# Patient Record
Sex: FEMALE | Race: WHITE | Employment: UNEMPLOYED | URBAN - METROPOLITAN AREA
[De-identification: names, ages, dates, MRNs, and addresses within clinical notes are randomized per-mention and may not be internally consistent; named-entity substitution may affect disease eponyms.]

---

## 2017-06-30 ENCOUNTER — APPOINTMENT (OUTPATIENT)
Dept: PHYSICAL THERAPY | Age: 57
End: 2017-06-30

## 2017-07-03 ENCOUNTER — APPOINTMENT (OUTPATIENT)
Dept: PHYSICAL THERAPY | Age: 57
End: 2017-07-03
Payer: COMMERCIAL

## 2017-07-19 ENCOUNTER — HOSPITAL ENCOUNTER (OUTPATIENT)
Dept: PHYSICAL THERAPY | Age: 57
Discharge: HOME | End: 2017-07-19
Payer: COMMERCIAL

## 2017-07-19 PROCEDURE — 97161 PT EVAL LOW COMPLEX 20 MIN: CPT

## 2017-07-19 PROCEDURE — 97112 NEUROMUSCULAR REEDUCATION: CPT

## 2017-07-19 NOTE — PROGRESS NOTES
PHYSICAL THERAPY - DAILY TREATMENT NOTE    Patient Name: Dana Foster        Date: 2017  : 1960   YES Patient  Verified  Visit #:   1   of   4  Insurance: Payor: Gracia Vela / [place]: [place] / Product Type: PPO /      In time: 11:00 Out time: 11;50   Total Treatment Time: 50     Medicare Time Tracking (below)   Total Timed Codes (min):  NA 1:1 Treatment Time:  NA     TREATMENT AREA =  Neuropathy, weakness, imbalance    SUBJECTIVE    Pain Level (on 0 to 10 scale):  0  / 10   Medication Changes/New allergies or changes in medical history, any new surgeries or procedures? NO    If yes, update Summary List   Subjective Functional Status/Changes:  []  No changes reported     Pt reports that she continues to feel off balance, continues to have numbness in tips of fingers and has noticed core weakness. Pt reports that she was doing exercises previously in PT, but stopped in the spring. Pt denies dizziness, pain. Pt reports occasional cramping B feet.           OBJECTIVE    Physical Therapy Evaluation  Neurologic    Posture: [] Poor    [x] Fair    [] Good    Describe: Minimally protracted head/shoulders       Gait: [x] Normal    [] Abnormal    Device: None     Describe: FGA    ROM:                             AROM    PROM   Shoulder Left Right Left Right   Flex [facility] WFL     Ext [facility] WFL     ABD [facility]     ER [facility] WFL     IR WFL WFL              AROM    PROM   Knee Left Right Left Right   Ext [facility]     Flex [facility] WFL               AROM                           PROM  Hip Left Right Left Right   Flex [facility] WFL     Ext [facility] WFL     ABD [facility]     ER WFL WFL     IR WFL WFL                                              AROM      PROM   Ankle Left Right Left Right   Ext [facility] WFL       Strength (MMT):  Shoulder L (1-5) R (1-5)   Shoulder Flexion 5 5   Shoulder Ext 5 5   Shoulder ABD 5 5   Shoulder ADD 5 5   Shoulder IR 5 5   Shoulder ER 5 5                                             Hip L (1-5) R (1-5)   Hip Flexion 4 4   Hip Ext 4 4   Hip ABD 4 4   Hip ADD NT NT   Hip ER NT NT   Hip IR NT NT     Knee L (1-5) R (1-5)   Knee Flexion 5 5   Knee Extension 5 5   Ankle PF 5 5   Ankle DF 5 5   Other       Tone: WNL    Motor Control: WNL    Sensation: Neuropathy - numbness fingers B hands    Reflexes: [x] Not Tested   Left Right   Biceps (C5)     Brachioradiais (C6)     Triceps (C7)     Knee Jerk (L4)     Ankle Jerk (SI)       Balance/ Equilibrium:              Left            Right  Tracks Across Midline yes yes   Reaches Across Midline yes yes         Sitting Balance: Static:  [x] Good    [] Fair    [] Poor     Dynamic:   [x] Good    [] Fair    [] Poor        Standing Balance: Static:   [] Good    [x] Fair    [] Poor     Dynamic:   [] Good    [x] Fair    [] Poor        Protective Extension:  [x] Present    [] Delayed    [] Absent        Romberg on foam: 30\"/30\"    Rail: 30\"/4\"      Sharpened Romber\"B EO/3\"B EC      Single Leg Stance: 30\"B    Functional Mobility      Bed Mobility:      Scooting: I       Rolling: I       Sit-Supine: I      Transfers:       Sit-Stand: I       Floor-Stand: NT      Gait:       Tandem: FGA       Backwards: FGA       Braiding: NT      Elevations:       Curbs: NT       Ramps: NT      Stairs: FGA    Behavior: [x] Cooperative    [] Impulsive    [] Agitated    [] Perseverative    [] Confused   Oriented x: 4    Cognition: [] One Step Commands   [x] Multiple Commands   [] Displays Neglect [] R  [] L    Other:       Impaired Judgement: [] Y    [x] N      Impaired Vision:  [] Y    [x] N      Safety Awareness Deficits  [] Y    [x] N      Impaired Hearing  [] Y    [x] N      Able to Express Needs [x] Y    [] N    Optional Tests:       Dynamic Gait Index (24pt scale): Functional Gait Assessment (30pt scale):        Morris Balance Scale (56pt scale):     Other test /comments:    15 min Neuromuscular Re-ed: See flowsheet   Rationale: improve balance to improve the patient's ability to perform ADLs/IADLs, functional mobility and gait safely and independently       During NM min Patient Education:  YES  Reviewed HEP   []  Progressed/Changed HEP based on:   Initiated HEP (copy in chart)     Other Objective/Functional Measures:    Initiated VSE and balance ex (refer to flowsheet for details)     Post Treatment Pain Level (on 0 to 10) scale:   0  / 10     ASSESSMENT    Assessment/Changes in Function:     Justification for Eval Code Complexity:  Patient History : HIGH - peripheral neuropathy; h/o breast cancer - s/p mastectomy and chemo/radiation, h/o skin cancer, s/p resection; HTN; prior vestibular therapy Examination HIGH - See objective  Clinical Presentation: LOW  Clinical Decision Making : LOW - FOTO 85/100    See objective     []  See Progress Note/Recertification   Patient will continue to benefit from skilled PT services: see plan of care   Progress toward goals / Updated goals:    See plan of care     PLAN    [x]  Upgrade activities as tolerated YES Continue plan of care   []  Discharge due to :    []  Other:      Therapist: Adrianne Alfonso, PT    Date: 7/19/2017 Time: 11:04 AM

## 2017-07-19 NOTE — PROGRESS NOTES
[address]  [facility] PHYSICAL THERAPY  [address] [facility] [address], [address] - Phone: [phone]  Fax: [phone] / [phone] [facility]  Patient Name: Gonzalez Oleary : 1960   Medical   Diagnosis: Gait instability [R26.81] Treatment Diagnosis: Gait instability [R26.81]   Onset Date: Chronic     Referral Source: Blane Dickey MD [facility]): 2017   Prior Hospitalization: See medical history Provider #: [id number]   Prior Level of Function: Independent with ADLs, ambulation; walking for exercise, playing golf, traveling   Comorbidities: Peripheral neuropathy; h/o breast cancer - s/p mastectomy and chemo/radiation, h/o skin cancer, s/p resection; HTN; prior physical therapy for balance training   Medications: Verified on Patient Summary List   The Plan of Care and following information is based on the information from the initial evaluation.   ===========================================================================================  Assessment / key information:  Patient is a 56 y.o. female who presents with complaints that she continues to feel off balance, continues to have numbness in tips of fingers and has noticed core weakness. Patient reports that she stopped doing the exercises previously prescribed in PT. Patient demonstrates decreased core/hip strength, decreased balance with eyes closed (rail stance EC = 4\", sharpened Romberg EC = 3\"B), and decreased balance with gait (Functional Gait Assessment = /30). FOTO = 85/100 (slight functional limitation). Balance with eyes closed and scores on FGA/FOTO have decreased compared to when patient was discharged from PT 2016. Patient would benefit from skilled PT services to address these issues and improve function.   Thank you for this referral.  ==========================================================================================  Eval Complexity: History: HIGH Complexity :3+ comorbidities / personal factors will impact the outcome/ POC Exam:HIGH Complexity : 4+ Standardized tests and measures addressing body structure, function, activity limitation and / or participation in recreation  Presentation: LOW Complexity : Stable, uncomplicated  Clinical Decision Making:LOW Complexity : FOTO score of 75-100Overall Complexity:LOW     Problem List: decrease strength, impaired gait/ balance and decrease ADL/ functional abilitiies   Treatment Plan may include any combination of the following: Therapeutic exercise, Therapeutic activities, Neuromuscular re-education, Physical agent/modality, Gait/balance training, Manual therapy, Patient education, Functional mobility training and Stair training  Patient / Family readiness to learn indicated by: asking questions, trying to perform skills and interest  Persons(s) to be included in education: patient (P)  Barriers to Learning/Limitations: None  Measures taken:    Patient Goal (s): \"Better balance. \"   Patient self reported health status: good  Rehabilitation Potential: fair   Short Term Goals: To be accomplished in  2  weeks:  1. Patient will demonstrate compliance with HEP. 2. Patient will score greater than or equal to 25/30 on FGA to indicate increased balance with ambulation. 3. Patient will maintain sharpened Romberg EC 5\"B to increase safety in challenging environments.  Long Term Goals: To be accomplished in  4  weeks:  1. Patient will demonstrate independence with HEP. 2. Patient will score greater than or equal to 27/30 on FGA to indicate increased balance with ambulation. 3. Patient will maintain rail stance EC 10\" to increase safety in challenging environments.   Frequency / Duration:   Patient to be seen  1  times per week for 4  weeks:  Patient / Caregiver education and instruction: self care, activity modification and exercises    Therapist Signature: Ab Murillo PT Date: 7/19/2017 Certification Period: NA Time: 12:54 PM   ===========================================================================================  I certify that the above Physical Therapy Services are being furnished while the patient is under my care. I agree with the treatment plan and certify that this therapy is necessary. Physician Signature:        Date:       Time:     Please sign and return to [facility] [employer] or you may fax the signed copy to [phone]. Thank you.

## 2017-07-24 ENCOUNTER — HOSPITAL ENCOUNTER (OUTPATIENT)
Dept: PHYSICAL THERAPY | Age: 57
Discharge: HOME | End: 2017-07-24
Payer: COMMERCIAL

## 2017-07-24 PROCEDURE — 97112 NEUROMUSCULAR REEDUCATION: CPT

## 2017-07-24 NOTE — PROGRESS NOTES
PHYSICAL THERAPY - DAILY TREATMENT NOTE    Patient Name: Ayad Fernandes        Date: 2017  : 1960   YES Patient  Verified  Visit #:   2   of   4  Insurance: Payor: Israel Lopez / Plan: [place] [employer] PPO / Product Type: PPO /      In time: 2:05 Out time: 2:35   Total Treatment Time: 30     Medicare Time Tracking (below)   Total Timed Codes (min):  NA 1:1 Treatment Time:  NA     TREATMENT AREA =  Gait instability [R26.81]  SUBJECTIVE    Pain Level (on 0 to 10 scale):  0  / 10   Medication Changes/New allergies or changes in medical history, any new surgeries or procedures? NO    If yes, update Summary List   Subjective Functional Status/Changes:  []  No changes reported     Pt without complaints, reports compliance with HEP (difficulty with EC). OBJECTIVE    25 min Neuromuscular Re-ed: See flowsheet   Rationale: improve balance to improve the patient's ability to perform ADLs/IADLs, functional mobility and gait safely and independently without increased symptoms      5 min Patient Education:  NO  Reviewed HEP   [x]  Progressed/Changed HEP based on:   Updated HEP (copy in chart)     Other Objective/Functional Measures:    Progressed VSE and balance ex, added gait ex     Post Treatment Pain Level (on 0 to 10) scale:   0  / 10     ASSESSMENT    Assessment/Changes in Function:     Tolerated exercises without complaints     []  See Progress Note/Recertification   Patient will continue to benefit from skilled PT services to modify and progress therapeutic interventions, address functional mobility deficits, address ROM deficits, address strength deficits, analyze and address soft tissue restrictions, analyze and cue movement patterns, analyze and modify body mechanics/ergonomics, assess and modify postural abnormalities, address imbalance/dizziness and instruct in home and community integration to attain remaining goals.    Progress toward goals / Updated goals:    Progressing slowly toward goals (first visit since evaluation):  1. Patient will demonstrate compliance with HEP. - Reports compliance  2. Patient will score greater than or equal to 25/30 on FGA to indicate increased balance with ambulation. - Progressed ex  3. Patient will maintain sharpened Romberg EC 5\"B to increase safety in challenging environments.  - Progressed ex       PLAN    [x]  Upgrade activities as tolerated YES Continue plan of care   []  Discharge due to :    []  Other:      Therapist: Gutierrez Maldonado PT    Date: 7/24/2017 Time: 2:06 PM     Future Appointments  Date Time Provider Yumiko Sánchez   8/10/2017 11:00 AM Curtis giordano, PT [facility]   8/21/2017 1:00 PM Rah Yip PT [facility]   8/28/2017 1:00 PM Rah Yip, PT [facility]

## 2017-08-10 ENCOUNTER — HOSPITAL ENCOUNTER (OUTPATIENT)
Dept: PHYSICAL THERAPY | Age: 57
Discharge: HOME | End: 2017-08-10
Payer: COMMERCIAL

## 2017-08-10 PROCEDURE — 97112 NEUROMUSCULAR REEDUCATION: CPT

## 2017-08-10 NOTE — PROGRESS NOTES
PHYSICAL THERAPY - DAILY TREATMENT NOTE    Patient Name: Jerome German        Date: 8/10/2017  : 1960   YES Patient  Verified  Visit #:   3   of   4  Insurance: Payor: Se Carrera / Plan: [place] PPO / Product Type: PPO /      In time: 2:30 Out time: 3:05   Total Treatment Time: 35     Medicare Time Tracking (below)   Total Timed Codes (min):  NA 1:1 Treatment Time:  NA     TREATMENT AREA =  Gait instability [R26.81]  SUBJECTIVE    Pain Level (on 0 to 10 scale):  0  / 10   Medication Changes/New allergies or changes in medical history, any new surgeries or procedures? NO    If yes, update Summary List   Subjective Functional Status/Changes:  []  No changes reported     Pt reports that she was away on vacation and unable to complete her exercises, reports that she did a lot of hiking on uneven ground.           OBJECTIVE    20 min Therapeutic Exercise:  [x]  See flow sheet   Rationale:      increase strength to improve the patients ability to perform ADLs/IADLs, functional mobility and gait safely and independently without increased pain/symptoms     10 min Neuromuscular Re-ed: See flowsheet (VSE/VVI, balance ex)   Rationale: improve balance to improve the patient's ability to perform ADLs/IADLs, functional mobility and gait safely and independently without increased pain/symptoms      5 min Patient Education:  YES  Reviewed HEP   []  Progressed/Changed HEP based on:   Updated HEP (copy in chart)     Other Objective/Functional Measures:    Initiated H/L toe taps, bridge, clam, SLR 3-way  Progressed VSE, added VVI  Progressed EC balance     Post Treatment Pain Level (on 0 to 10) scale:   0  / 10     ASSESSMENT    Assessment/Changes in Function:     Tolerated exercises without complaints     []  See Progress Note/Recertification   Patient will continue to benefit from skilled PT services to modify and progress therapeutic interventions, address functional mobility deficits, address ROM deficits, address strength deficits, analyze and address soft tissue restrictions, analyze and cue movement patterns, analyze and modify body mechanics/ergonomics, assess and modify postural abnormalities, address imbalance/dizziness and instruct in home and community integration to attain remaining goals. Progress toward goals / Updated goals:    Progressing toward goals:  1. Patient will demonstrate compliance with HEP. - Reports compliance  2. Patient will score greater than or equal to 25/30 on FGA to indicate increased balance with ambulation. - Progressed ex  3. Patient will maintain sharpened Romberg EC 5\"B to increase safety in challenging environments.  - Progressed ex       PLAN    [x]  Upgrade activities as tolerated YES Continue plan of care   []  Discharge due to :    []  Other:      Therapist: Luc Lugo PT    Date: 8/10/2017 Time: 2:43 PM     Future Appointments  Date Time Provider Yumiko Sánchez   8/21/2017 1:00 PM Leopold Laurel, PT [facility]   8/28/2017 1:00 PM Leopold Laurel, PT [facility]

## 2017-08-21 ENCOUNTER — APPOINTMENT (OUTPATIENT)
Dept: PHYSICAL THERAPY | Age: 57
End: 2017-08-21
Payer: COMMERCIAL

## 2017-08-23 ENCOUNTER — HOSPITAL ENCOUNTER (OUTPATIENT)
Dept: PHYSICAL THERAPY | Age: 57
Discharge: HOME | End: 2017-08-23
Payer: COMMERCIAL

## 2017-08-23 PROCEDURE — 97112 NEUROMUSCULAR REEDUCATION: CPT

## 2017-08-23 NOTE — PROGRESS NOTES
[address]  [facility] PHYSICAL THERAPY  [address] [place] [address] [address], [address] - Phone: [phone]  Fax: [phone]  PROGRESS NOTE  Patient Name: Dana Foster  : 1960   Treatment/Medical Diagnosis: Gait instability [R26.81]   Referral Source: Addy Cline MD     Date of Initial Visit: 2017 Attended Visits: 4 Missed Visits: 1     SUMMARY OF TREATMENT  Treatment has consisted of initial evaluation and 3 treatment sessions, which have included vestibular training exercises, balance training exercises, core/LE strengthening exercises and patient education (including HEP). CURRENT STATUS  Patient was progressing well with exercises in clinic and reporting compliance with HEP. However, patient presented to last treatment session (2017) with report of R-sided lower back pain, which began after lifting bags and coolers while on vacation. Patient reported that she had not been performing her exercises since injuring her back because she had increased pain when attempting to perform them. Patient able to perform some balance exercises in clinic on 2017 without increased pain. Patient was advised to gradually resume exercises as tolerated if able to perform without increased pain, and was advised to f/u with MD if pain persists. Functional Gait Assessment = 25/30, which is increased from 23/30 at initial evaluation, indicating improved balance. Patient able to perform sharpened Romberg eyes closed 30\"R/18\"L (increased from 3\"B at initial evaluation) and rail stance eyes closed 30\" (increased from 4\" at initial evaluation). Goal/Measure of Progress Goal Met? 1. Patient will demonstrate compliance with HEP. Status at last Eval: NA Current Status: Compliant with HEP prior to back injury, instructed to resume HEP as tolerated Progressing toward goal   2.   Patient will score greater than or equal to 25/30 on FGA to indicate increased balance with ambulation. Status at last Eval: FGA = 23/30 Current Status: FGA = 25/30 yes   3. Patient will maintain sharpened Romberg EC 5\"B to increase safety in challenging environments. Status at last Eval: SR EC = 3\"B Current Status: SR EC = 30\"R/18\"L yes     New Goals to be achieved in __1-2__  weeks:  1. Patient will demonstrate independence with HEP. 2. Patient will score greater than or equal to 27/30 on FGA to indicate increased balance with ambulation. 3. Patient will maintain sharpened Romberg EC 30\"L to increase safety in challenging environments. RECOMMENDATIONS  Patient would benefit from continued skilled PT services to address remaining balance and core/LE strength deficits and insure independence with HEP. Recommend f/u with MD if back pain persists. If you have any questions/comments please contact us directly at [phone]. Thank you for allowing us to assist in the care of your patient. Therapist Signature: Fernanda Renae PT Date: 8/23/2017     Time: 9:10 AM   NOTE TO PHYSICIAN:  PLEASE COMPLETE THE ORDERS BELOW AND FAX TO   [facility] Physical Therapy: [phone]. If you are unable to process this request in 24 hours please contact our office: [phone].    ___ I have read the above report and request that my patient continue as recommended.   ___ I have read the above report and request that my patient continue therapy with the following changes/special instructions:_________________________________________________________   ___ I have read the above report and request that my patient be discharged from therapy.      Physician Signature:        Date:       Time:

## 2017-08-23 NOTE — PROGRESS NOTES
PHYSICAL THERAPY - DAILY TREATMENT NOTE    Patient Name: Chriss Ruiz        Date: 2017  : 1960   YES Patient  Verified  Visit #:   4   of   4  Insurance: Payor: Vladislav Almanzar / Plan: [place] / Product Type: PPO /      In time: 8:35 Out time: 8:55   Total Treatment Time: 20     Medicare Time Tracking (below)   Total Timed Codes (min):  NA 1:1 Treatment Time:  NA     TREATMENT AREA =  Gait instability [R26.81]  SUBJECTIVE    Pain Level (on 0 to 10 scale):  1-2  / 10 R L/S   Medication Changes/New allergies or changes in medical history, any new surgeries or procedures? NO    If yes, update Summary List   Subjective Functional Status/Changes:  []  No changes reported     Pt reports that she injured her back while lifting during vacation. Pt reports R-sided L/S pain. Pt reports that her exercises increase the pain so she has been taking a break from them.           OBJECTIVE    20 min Neuromuscular Re-ed: FGA, balance exercises   Rationale: assess and improve balance to improve the patient's ability to perform ADLs/IADLs, functional mobility and gait safely and independently without increased pain/symptoms       During NM min Patient Education:  YES  Reviewed HEP   []  Progressed/Changed HEP based on:   Discussed resuming HEP as tolerated (avoiding increased pain) and following up with MD if pain persists     Other Objective/Functional Measures:    FGA = 25/30  SR EC = 18\"L/30\"R  Rail stance EC = 30\"     Post Treatment Pain Level (on 0 to 10) scale:   1-2  / 10     ASSESSMENT    Assessment/Changes in Function:     Tolerated exercises without increased pain  See progress note     [x]  See Progress Note/Recertification   Patient will continue to benefit from skilled PT services: see progress note   Progress toward goals / Updated goals:    See progress note     PLAN    [x]  Upgrade activities as tolerated YES Continue plan of care   []  Discharge due to :    []  Other:      Therapist: Natalya Sanchez Martha Hutchison, PT    Date: 8/23/2017 Time: 8:37 AM     Future Appointments  Date Time Provider Yumiko Sánchez   8/28/2017 1:00 PM Clay Pineda, PT [facility]

## 2017-08-28 ENCOUNTER — HOSPITAL ENCOUNTER (OUTPATIENT)
Dept: PHYSICAL THERAPY | Age: 57
Discharge: HOME | End: 2017-08-28
Payer: COMMERCIAL

## 2017-08-28 PROCEDURE — 97112 NEUROMUSCULAR REEDUCATION: CPT

## 2017-08-28 PROCEDURE — 97530 THERAPEUTIC ACTIVITIES: CPT

## 2017-08-28 NOTE — PROGRESS NOTES
PHYSICAL THERAPY - DAILY TREATMENT NOTE    Patient Name: Ofelia Montelongo        Date: 2017  : 1960   YES Patient  Verified  Visit #:   5   of   5-6  Insurance: Payor: Bronwyn Jc / Plan: [place] PPO / Product Type: PPO /      In time: 1:05 Out time: 1:30   Total Treatment Time: 25     Medicare Time Tracking (below)   Total Timed Codes (min):  NA 1:1 Treatment Time:  NA     TREATMENT AREA =  Gait instability [R26.81]  SUBJECTIVE    Pain Level (on 0 to 10 scale):  0  / 10   Medication Changes/New allergies or changes in medical history, any new surgeries or procedures? NO    If yes, update Summary List   Subjective Functional Status/Changes:  []  No changes reported     Pt reports mild intermittent back pain, which she reports has improved since last week. OBJECTIVE    10 min Therapeutic Activity: FGA   Rationale: assess balance to improve the patient's ability to perform ADLs/IADLs, functional mobility and gait safely and independently without increased pain/symptoms      15 min Neuromuscular Re-ed: VVI, balance ex per flowsheet   Rationale: improve balance to improve the patient's ability to perform ADLs/IADLs, functional mobility and gait safely and independently without increased pain/symptoms      During NM min Patient Education:  YES  Reviewed HEP   [x]  Progressed/Changed HEP based on:   Cont HEP; progressed VVI and EC balance on pillow; reviewed taper     Other Objective/Functional Measures:    FOTO = 30/30  SR EC = 5\"B     Post Treatment Pain Level (on 0 to 10) scale:   0  / 10     ASSESSMENT    Assessment/Changes in Function:     See discharge note     []  See Progress Note/Recertification      Progress toward goals / Updated goals:    See discharge note     PLAN    []  Upgrade activities as tolerated NO Continue plan of care   [x]  Discharge due to : I with HEP   []  Other:      Therapist: Sotero Soares, PT    Date: 2017 Time: 1:05 PM     No future appointments.

## 2017-08-28 NOTE — PROGRESS NOTES
[address]  [facility] PHYSICAL THERAPY  [address], [place], [address] - Phone: [phone]  Fax: [phone]  DISCHARGE SUMMARY  Patient Name: Remigio Sacks : 1960   Treatment/Medical Diagnosis: Gait instability [R26.81]   Referral Source: Dora Xiong MD     Date of Initial Visit: 2017 Attended Visits: 5 Missed Visits: 1     SUMMARY OF TREATMENT  Treatment has consisted of initial evaluation and 4 treatment sessions, which have included vestibular training exercises, balance training exercises, core/LE strengthening exercises and patient education (including HEP). CURRENT STATUS  Patient has progressed well with exercises in clinic, and demonstrates independence with HEP. Patient reports that she continues to have mild intermittent low back pain, but has been able to resume the majority of exercises included in HEP. Discussed continued resumption of exercises as tolerated and advised patient to f/u with MD if back pain worsens or persists. Reviewed vestibular taper. FGA = 30/30. Patient able to maintain single leg stance 30\"B, sharpened Romberg eyes closed 5\"B this session. Goal/Measure of Progress Goal Met? 1. Patient will demonstrate independence with HEP. Status at last Eval: Complaint with HEP prior to back injury, instructed to resume HEP as tolerated Current Status: Resumed HEP as tolerated, I with HEP/taper yes   2. Patient will score greater than or equal to 27/30 on FGA to indicate increased balance with ambulation. Status at last Eval: FGA = 25/30 Current Status: FGA = 30/30 yes   3. Patient will maintain sharpened Romberg EC 30\"L to increase safety in challenging environments. Status at last Eval: SR EC = 30\"R/18\"L Current Status: SR EC = 5\"B no   RECOMMENDATIONS  Discontinue therapy. Progressing towards or have reached established goals. If you have any questions/comments please contact us directly at [phone]. Thank you for allowing us to assist in the care of your patient. Therapist Signature: Fernanda Renae PT Date: 8/28/2017     Time: 1:29 PM     NOTE TO PHYSICIAN:  PLEASE COMPLETE THE ORDERS BELOW AND FAX TO   [facility] [place] [facility]: [phone]. If you are unable to process this request in 24 hours please contact our office: [phone].    ___ I have read the above report and request that my patient be discharged from therapy.      Physician Signature:        Date:       Time:

## 2017-12-19 PROBLEM — N39.0 URINARY TRACT INFECTION WITH HEMATURIA: Status: ACTIVE | Noted: 2017-12-19

## 2017-12-19 PROBLEM — R31.9 URINARY TRACT INFECTION WITH HEMATURIA: Status: ACTIVE | Noted: 2017-12-19

## 2024-05-04 ENCOUNTER — OFFICE VISIT (OUTPATIENT)
Dept: URGENT CARE | Facility: CLINIC | Age: 64
End: 2024-05-04

## 2024-05-04 VITALS
OXYGEN SATURATION: 98 % | SYSTOLIC BLOOD PRESSURE: 131 MMHG | WEIGHT: 140 LBS | HEART RATE: 80 BPM | RESPIRATION RATE: 17 BRPM | TEMPERATURE: 98 F | DIASTOLIC BLOOD PRESSURE: 90 MMHG

## 2024-05-04 DIAGNOSIS — R30.0 DYSURIA: ICD-10-CM

## 2024-05-04 DIAGNOSIS — N39.0 URINARY TRACT INFECTION WITHOUT HEMATURIA, SITE UNSPECIFIED: Primary | ICD-10-CM

## 2024-05-04 LAB
BILIRUB UR QL STRIP: NEGATIVE
GLUCOSE UR QL STRIP: NEGATIVE
KETONES UR QL STRIP: NEGATIVE
LEUKOCYTE ESTERASE UR QL STRIP: POSITIVE
PH, POC UA: 7 (ref 5–8)
POC BLOOD, URINE: NEGATIVE
POC NITRATES, URINE: NEGATIVE
PROT UR QL STRIP: POSITIVE
SP GR UR STRIP: 1.01 (ref 1–1.03)
UROBILINOGEN UR STRIP-ACNC: NORMAL (ref 0.1–1.1)

## 2024-05-04 PROCEDURE — 87186 SC STD MICRODIL/AGAR DIL: CPT | Performed by: NURSE PRACTITIONER

## 2024-05-04 PROCEDURE — 87077 CULTURE AEROBIC IDENTIFY: CPT | Performed by: NURSE PRACTITIONER

## 2024-05-04 PROCEDURE — 81003 URINALYSIS AUTO W/O SCOPE: CPT | Mod: QW,TIER,S$GLB, | Performed by: NURSE PRACTITIONER

## 2024-05-04 PROCEDURE — 99203 OFFICE O/P NEW LOW 30 MIN: CPT | Mod: TIER,S$GLB,, | Performed by: NURSE PRACTITIONER

## 2024-05-04 PROCEDURE — 87086 URINE CULTURE/COLONY COUNT: CPT | Performed by: NURSE PRACTITIONER

## 2024-05-04 PROCEDURE — 87088 URINE BACTERIA CULTURE: CPT | Performed by: NURSE PRACTITIONER

## 2024-05-04 RX ORDER — LOSARTAN POTASSIUM 100 MG/1
TABLET ORAL
COMMUNITY
Start: 2024-03-19

## 2024-05-04 RX ORDER — NITROFURANTOIN 25; 75 MG/1; MG/1
100 CAPSULE ORAL 2 TIMES DAILY
Qty: 14 CAPSULE | Refills: 0 | Status: SHIPPED | OUTPATIENT
Start: 2024-05-04 | End: 2024-05-11

## 2024-05-04 RX ORDER — AMLODIPINE BESYLATE 2.5 MG/1
TABLET ORAL
COMMUNITY
Start: 2024-05-03

## 2024-05-04 RX ORDER — MELATONIN 1 MG
TABLET,CHEWABLE ORAL
COMMUNITY

## 2024-05-04 RX ORDER — VIT C/E/ZN/COPPR/LUTEIN/ZEAXAN 250MG-90MG
CAPSULE ORAL
COMMUNITY

## 2024-05-04 RX ORDER — LORATADINE 10 MG/1
10 TABLET ORAL
COMMUNITY

## 2024-05-04 RX ORDER — ACETAMINOPHEN 160 MG
TABLET,CHEWABLE ORAL
COMMUNITY

## 2024-05-04 RX ORDER — LETROZOLE 2.5 MG/1
TABLET, FILM COATED ORAL
COMMUNITY
Start: 2023-09-27

## 2024-05-04 RX ORDER — GABAPENTIN 100 MG/1
CAPSULE ORAL
COMMUNITY
Start: 2024-04-15

## 2024-05-04 RX ORDER — GABAPENTIN 400 MG/1
CAPSULE ORAL
COMMUNITY

## 2024-05-04 RX ORDER — CHOLECALCIFEROL (VITAMIN D3) 25 MCG
1000 TABLET ORAL
COMMUNITY

## 2024-05-04 NOTE — PROGRESS NOTES
Subjective:      Patient ID: Jazmine Chacon is a 64 y.o. female.    Vitals:  weight is 63.5 kg (140 lb). Her oral temperature is 98.2 °F (36.8 °C). Her blood pressure is 131/90 (abnormal) and her pulse is 80. Her respiration is 17 and oxygen saturation is 98%.     Chief Complaint: Urinary Frequency    Pt states she is having possible UTI sx that started Thursday night, says she has urgency, frequency, and some pain. States she took Azo yesterday to help treat.  Provider note begins below    Denies fever or flank pain.  She has not had a urinary tract infection in years.  She has been visiting [place] [facility].  Reports some bladder pressure.    Urinary Frequency   This is a new problem. The quality of the pain is described as burning. The pain is at a severity of 4/10. Associated symptoms include frequency and urgency. Pertinent negatives include no flank pain, hematuria, nausea, vomiting or constipation.       Constitution: Negative for sweating, fatigue and fever.   Cardiovascular:  Negative for chest pain and sob on exertion.   Respiratory:  Negative for cough and shortness of breath.    Gastrointestinal:  Negative for nausea, vomiting, constipation and diarrhea.   Genitourinary:  Positive for frequency and urgency. Negative for flank pain and hematuria.      Objective:     Physical Exam   Constitutional: She is oriented to person, place, and time.   HENT:   Head: Normocephalic and atraumatic.   Cardiovascular: Normal rate.   Pulmonary/Chest: Effort normal. No respiratory distress.   Abdominal: Normal appearance. She exhibits no distension and no mass. Soft. There is no abdominal tenderness. There is no rebound, no guarding, no left CVA tenderness and no right CVA tenderness. No hernia.   Neurological: She is alert and oriented to person, place, and time.   Skin: Skin is warm and dry.   Psychiatric: Her behavior is normal. Mood normal.           Results for orders placed or performed in visit on 05/04/24   POCT  Urinalysis, Dipstick, Automated, W/O Scope   Result Value Ref Range    POC Blood, Urine Negative Negative    POC Bilirubin, Urine Negative Negative    POC Urobilinogen, Urine normal 0.1 - 1.1    POC Ketones, Urine Negative Negative    POC Protein, Urine Positive (A) Negative    POC Nitrates, Urine Negative Negative    POC Glucose, Urine Negative Negative    pH, UA 7.0 5 - 8    POC Specific Gravity, Urine 1.015 1.003 - 1.029    POC Leukocytes, Urine Positive (A) Negative      Assessment:     1. Urinary tract infection without hematuria, site unspecified    2. Dysuria        Plan:   Urine with leukocytes   Due to high-risk status will send urine for culture.  Patient is okay with this   Increase liquids.  Wipe front to back.  Avoid tub baths.        Urinary tract infection without hematuria, site unspecified  -     nitrofurantoin, macrocrystal-monohydrate, (MACROBID) 100 MG capsule; Take 1 capsule (100 mg total) by mouth 2 (two) times daily. for 7 days  Dispense: 14 capsule; Refill: 0  -     Urine culture    Dysuria  -     POCT Urinalysis, Dipstick, Automated, W/O Scope

## 2024-05-07 LAB — BACTERIA UR CULT: ABNORMAL

## 2024-05-11 ENCOUNTER — TELEPHONE (OUTPATIENT)
Dept: URGENT CARE | Facility: CLINIC | Age: 64
End: 2024-05-11

## 2024-05-11 NOTE — TELEPHONE ENCOUNTER
I called Patient to give her Results to urine culture and her symptoms have improved.  Patient had no questions to be asked.

## 2024-05-11 NOTE — TELEPHONE ENCOUNTER
----- Message from Sherine Davies MD sent at 5/8/2024 11:36 AM CDT -----  Please document in phone call encounter   · Whether you Spoke with patient  · Whether Patient reports - improvement/worsening/no change of symptoms  · Action - let them know results came back showing an  infection that was appropriately treated.     If they have any concerns or questions about their health, let them know it's important to follow up with their primary care provider or healthcare professional for further guidance and clarification.